# Patient Record
Sex: MALE | Race: WHITE | HISPANIC OR LATINO | Employment: FULL TIME | URBAN - METROPOLITAN AREA
[De-identification: names, ages, dates, MRNs, and addresses within clinical notes are randomized per-mention and may not be internally consistent; named-entity substitution may affect disease eponyms.]

---

## 2019-06-30 ENCOUNTER — HOSPITAL ENCOUNTER (EMERGENCY)
Facility: MEDICAL CENTER | Age: 19
End: 2019-06-30
Attending: EMERGENCY MEDICINE | Admitting: EMERGENCY MEDICINE
Payer: MEDICAID

## 2019-06-30 VITALS
RESPIRATION RATE: 18 BRPM | OXYGEN SATURATION: 96 % | WEIGHT: 179.23 LBS | BODY MASS INDEX: 28.13 KG/M2 | DIASTOLIC BLOOD PRESSURE: 78 MMHG | HEART RATE: 75 BPM | HEIGHT: 67 IN | SYSTOLIC BLOOD PRESSURE: 127 MMHG | TEMPERATURE: 98.4 F

## 2019-06-30 DIAGNOSIS — R04.0 EPISTAXIS: ICD-10-CM

## 2019-06-30 PROCEDURE — 700102 HCHG RX REV CODE 250 W/ 637 OVERRIDE(OP): Performed by: EMERGENCY MEDICINE

## 2019-06-30 PROCEDURE — A9270 NON-COVERED ITEM OR SERVICE: HCPCS | Performed by: EMERGENCY MEDICINE

## 2019-06-30 PROCEDURE — 99283 EMERGENCY DEPT VISIT LOW MDM: CPT

## 2019-06-30 RX ORDER — OXYMETAZOLINE HYDROCHLORIDE 0.05 G/100ML
2 SPRAY NASAL ONCE
Status: COMPLETED | OUTPATIENT
Start: 2019-06-30 | End: 2019-06-30

## 2019-06-30 RX ADMIN — OXYMETAZOLINE HYDROCHLORIDE 2 SPRAY: 5 SPRAY NASAL at 05:30

## 2019-06-30 NOTE — ED PROVIDER NOTES
"ED Provider Note    CHIEF COMPLAINT  Chief Complaint   Patient presents with   • Epistaxis       HPI  Damian Brandt is a 19 y.o. male who is otherwise healthy, has had recent upper respiratory tract infection, who presents with intermittent epistaxis for the past 2 days.  Initially would last only about 20 minutes, however he woke up this morning around midnight with bleeding from the left nostril, this then progressed to bleeding to the right nostril, and he has had difficulty with controlling this which is why he presents for evaluation.  No known easy bruisability, no noted bleeding from gums, no other complaints.  Not on any blood thinners or antiplatelet agents.    REVIEW OF SYSTEMS  Pertinent positives: Upper respiratory tract infection, epistaxis  Pertinent negatives: No bruising, no weakness, no dizziness, no bleeding from the gums  10 + systems reviewed and otherwise negative    PAST MEDICAL HISTORY       SOCIAL HISTORY  Social History     Social History Main Topics   • Smoking status: Not on file   • Smokeless tobacco: Not on file   • Alcohol use Not on file   • Drug use: Unknown   • Sexual activity: Not on file       SURGICAL HISTORY  patient denies any surgical history    CURRENT MEDICATIONS  Home Medications    **Home medications have not yet been reviewed for this encounter**         ALLERGIES  No Known Allergies    PHYSICAL EXAM  VITAL SIGNS: /92   Pulse (!) 104   Temp 36.9 °C (98.4 °F) (Temporal)   Resp 18   Ht 1.702 m (5' 7\")   Wt 81.3 kg (179 lb 3.7 oz)   SpO2 96%   BMI 28.07 kg/m²   Pulse ox interpretation: I interpret this pulse ox as normal  Constitutional: Alert, in no apparent distress   HENT: Normocephalic, Atraumatic, Bilateral external ears normal.  Dark blood seeping from right nostril.  Clot occluding visualization of left turbinates and nasal passage.  Eyes: Pupils are equal and reactive. Conjunctiva normal, non-icteric.   Heart: Regular rate and rythm, no " murmurs. Radial pulses 2+  Lungs:  Clear to auscultation bilaterally.   Skin: Warm, Dry, No erythema, No rash.   Musculoskeletal: No obvious deformities  Neurologic: Alert, Fluent speech. No facial droop. Grossly non-focal.   Psychiatric: Affect normal, Judgment normal, Mood normal, Appears appropriate and not intoxicated.     DIAGNOSTIC STUDIES / PROCEDURES        COURSE & MEDICAL DECISION MAKING  Nursing notes and vital signs reviewed. Pertinent Labs & Imaging studies reviewed. (See chart for details)    19-year-old male with upper respiratory tract infection and bleeding from both nostrils.  Physical exam here is notable for blood clot in the left nostril, slow seepage of blood from right nostril.  Given patient has no history of easy bruisability, bleeding from the gums, or other associated symptoms, this is most likely secondary to patient's upper respiratory tract infection.  Clamp was placed of the nose for 15 minutes.  Nose was reexamined and there was no further active bleeding.  Patient was encouraged to gently blow nose, large amount of clot was produced from both nostrils.  Nose was examined, no obvious bleeding site identified.  Afrin, 2 sprays, were used in both nostrils.  Patient was observed for period of 30 minutes following above with no further bleeding.  Discharge to home with strict return precautions and improved condition.      DISPOSITION:  Patient will be discharged home in stable condition.    FOLLOW UP:  [facility], Emergency Dept  [address]  [place] [address]  [phone]    As needed      OUTPATIENT MEDICATIONS:  New Prescriptions    No medications on file       FINAL IMPRESSION  (R04.0) Epistaxis      Electronically signed by: Malathi Martinez, 6/30/2019 3:51 AM    This dictation was created using voice recognition software. The accuracy of the dictation is limited to the abilities of the software. I expect there may be some errors of grammar  and possibly content. The nursing notes were reviewed and certain aspects of this information were incorporated into this note.

## 2019-06-30 NOTE — ED NOTES
Patient discharged home. No acute distress at this time. VS stable. No bleeding noted at this time.

## 2019-06-30 NOTE — DISCHARGE INSTRUCTIONS
You are being discharged to home with Afrin.  This is a medication that helps constrict the blood vessels.  If you notice that your nose is starting to bleed then please use the Afrin.  Otherwise use the Afrin 2 sprays in each nostril twice daily for the next 2 days.  Try and keep your nose moist with a saline spray, these can be purchased at any pharmacy.  Return for persistent bleeding, dizziness, lightheadedness, or any other concerns.

## 2021-06-04 ENCOUNTER — IMMUNIZATION (OUTPATIENT)
Dept: FAMILY PLANNING/WOMEN'S HEALTH CLINIC | Facility: IMMUNIZATION CENTER | Age: 21
End: 2021-06-04
Payer: MEDICAID

## 2021-06-04 DIAGNOSIS — Z23 ENCOUNTER FOR VACCINATION: Primary | ICD-10-CM

## 2021-06-04 PROCEDURE — 0002A PFIZER SARS-COV-2 VACCINE: CPT

## 2021-06-04 PROCEDURE — 91300 PFIZER SARS-COV-2 VACCINE: CPT

## 2024-10-30 ENCOUNTER — HOSPITAL ENCOUNTER (EMERGENCY)
Facility: MEDICAL CENTER | Age: 24
End: 2024-10-31
Attending: EMERGENCY MEDICINE

## 2024-10-30 ENCOUNTER — HOSPITAL ENCOUNTER (EMERGENCY)
Facility: MEDICAL CENTER | Age: 24
End: 2024-10-30
Attending: STUDENT IN AN ORGANIZED HEALTH CARE EDUCATION/TRAINING PROGRAM

## 2024-10-30 VITALS
RESPIRATION RATE: 18 BRPM | DIASTOLIC BLOOD PRESSURE: 99 MMHG | HEART RATE: 102 BPM | BODY MASS INDEX: 37.11 KG/M2 | WEIGHT: 280 LBS | OXYGEN SATURATION: 97 % | SYSTOLIC BLOOD PRESSURE: 170 MMHG | TEMPERATURE: 96.6 F | HEIGHT: 73 IN

## 2024-10-30 DIAGNOSIS — N50.811 TESTICULAR PAIN, RIGHT: ICD-10-CM

## 2024-10-30 PROCEDURE — 99283 EMERGENCY DEPT VISIT LOW MDM: CPT

## 2024-10-30 PROCEDURE — 96372 THER/PROPH/DIAG INJ SC/IM: CPT

## 2024-10-30 PROCEDURE — 36415 COLL VENOUS BLD VENIPUNCTURE: CPT

## 2024-10-31 ENCOUNTER — APPOINTMENT (OUTPATIENT)
Dept: RADIOLOGY | Facility: MEDICAL CENTER | Age: 24
End: 2024-10-31
Attending: EMERGENCY MEDICINE

## 2024-10-31 VITALS
RESPIRATION RATE: 17 BRPM | DIASTOLIC BLOOD PRESSURE: 99 MMHG | TEMPERATURE: 97.9 F | HEART RATE: 104 BPM | WEIGHT: 279.98 LBS | HEIGHT: 73 IN | OXYGEN SATURATION: 93 % | BODY MASS INDEX: 37.11 KG/M2 | SYSTOLIC BLOOD PRESSURE: 181 MMHG

## 2024-10-31 LAB
AMORPHOUS CRYSTALS 1764: PRESENT /HPF
APPEARANCE UR: ABNORMAL
BACTERIA #/AREA URNS HPF: ABNORMAL /HPF
BILIRUB UR QL STRIP.AUTO: NEGATIVE
CASTS URNS QL MICRO: ABNORMAL /LPF (ref 0–2)
COLOR UR: YELLOW
EPITHELIAL CELLS 1715: ABNORMAL /HPF (ref 0–5)
GLUCOSE UR STRIP.AUTO-MCNC: NEGATIVE MG/DL
KETONES UR STRIP.AUTO-MCNC: NEGATIVE MG/DL
LEUKOCYTE ESTERASE UR QL STRIP.AUTO: NEGATIVE
MICRO URNS: ABNORMAL
NITRITE UR QL STRIP.AUTO: NEGATIVE
PH UR STRIP.AUTO: 7.5 [PH] (ref 5–8)
PROT UR QL STRIP: NEGATIVE MG/DL
RBC # URNS HPF: ABNORMAL /HPF (ref 0–2)
RBC UR QL AUTO: NEGATIVE
SP GR UR STRIP.AUTO: 1.02
UROBILINOGEN UR STRIP.AUTO-MCNC: 1 EU/DL
WBC #/AREA URNS HPF: ABNORMAL /HPF

## 2024-10-31 PROCEDURE — 700102 HCHG RX REV CODE 250 W/ 637 OVERRIDE(OP): Performed by: EMERGENCY MEDICINE

## 2024-10-31 PROCEDURE — 76870 US EXAM SCROTUM: CPT

## 2024-10-31 PROCEDURE — A9270 NON-COVERED ITEM OR SERVICE: HCPCS | Performed by: EMERGENCY MEDICINE

## 2024-10-31 PROCEDURE — 81015 MICROSCOPIC EXAM OF URINE: CPT

## 2024-10-31 PROCEDURE — 81003 URINALYSIS AUTO W/O SCOPE: CPT

## 2024-10-31 RX ORDER — ACETAMINOPHEN 325 MG/1
650 TABLET ORAL ONCE
Status: COMPLETED | OUTPATIENT
Start: 2024-10-31 | End: 2024-10-31

## 2024-10-31 RX ADMIN — ACETAMINOPHEN 650 MG: 325 TABLET ORAL at 02:19

## 2024-10-31 NOTE — ED TRIAGE NOTES
Chief Complaint   Patient presents with    Testicle Pain     Pt reports pain and swelling to right testicle x2 days    Leg Pain     Pt reports tenderness and tingling to his right thigh.     Ambulatory  to triage for above complaint with family. Pt reports having intercourse 3 days prior and trying a new ring toy that goes around his testicles and a thc gummy. Pt states that he developed right testicular pain and swelling afterwards. Pt states he has also had pain and tingling to his RLE around his thigh. Pt denies hematuria but reports urinating more frequently and some discomfort when having a BM. Denies any open wound, discharge, or bleeding at the site.     Pt placed in lobby and educated on triage process. Pt encouraged to alert staff for any changes.    Patient and staff wearing appropriate PPE.

## 2024-10-31 NOTE — ED NOTES
This version of the note has been redacted during the course of a chart correction case. If you need access to the original text of this version of the note, please contact the Health Information Management department at [phone].

## 2024-10-31 NOTE — ED TRIAGE NOTES
"Chief Complaint   Patient presents with    Testicle Pain     Chief Complaint  Patient presents with  · Testicle Pain      Pt reports pain and swelling to right testicle x2 days  · Leg Pain      Pt reports tenderness and tingling to his right thigh.    Ambulatory  to triage for above complaint with family. Pt reports having intercourse 3 days prior and trying a new ring toy that goes around his testicles and a thc gummy. Pt states that he developed right testicular pain and swelling afterwards. Pt states he has also had pain and tingling to his RLE around his thig     BP (!) 192/113   Pulse (!) 101   Temp 36.5 °C (97.7 °F) (Temporal)   Resp 20   Ht 1.854 m (6' 1\")   Wt (!) 127 kg (279 lb 15.8 oz)   SpO2 94%   BMI 36.94 kg/m²     Pt here for above cc  Testicle pain and swelling to R side for x2 days  VSS, A&O x4, ambulatory   Denies any PMH or meds   "

## 2024-10-31 NOTE — ED PROVIDER NOTES
ED Provider Note    CHIEF COMPLAINT  Chief Complaint   Patient presents with    Testicle Pain     Chief Complaint  Patient presents with  · Testicle Pain      Pt reports pain and swelling to right testicle x2 days  · Leg Pain      Pt reports tenderness and tingling to his right thigh.    Ambulatory  to triage for above complaint with family. Pt reports having intercourse 3 days prior and trying a new ring toy that goes around his testicles and a thc gummy. Pt states that he developed right testicular pain and swelling afterwards. Pt states he has also had pain and tingling to his RLE around his thig       EXTERNAL RECORDS REVIEWED  Other noncontributory    HPI/ROS  LIMITATION TO HISTORY   Select: : None  OUTSIDE HISTORIAN(S):  Significant other      Dc Timmons is a 24 y.o. male who presents to the emergency department through triage with significant other for right testicular pain.  Patient describes 2 days of persistent right testicular pain.  States right testicle sits higher than the left now.  No swelling or discoloration.  Urinary frequency without discomfort, hematuria.  No abdominal pain or flank pain.  Patient states pain began after using a penile or testicular ring during intercourse almost 24 hours prior to symptom onset.  Denies penile drainage or concern for STD.    Patient describes some chronic low back pain, intermittent paresthesias to the right posterior thigh.  He states this is due to work.    PAST MEDICAL HISTORY   Denies    SURGICAL HISTORY  patient denies any surgical history    FAMILY HISTORY  History reviewed. No pertinent family history.    SOCIAL HISTORY  Social History     Tobacco Use    Smoking status: Never    Smokeless tobacco: Never   Substance and Sexual Activity    Alcohol use: Not Currently    Drug use: Never    Sexual activity: Not on file       CURRENT MEDICATIONS  Home Medications       Reviewed by Bettie Fernandes R.N. (Registered Nurse) on 10/30/24 at  "2349  Med List Status: Partial     Medication Last Dose Status        Patient Darwin Taking any Medications                           ALLERGIES  Allergies   Allergen Reactions    Shellfish-Derived Products Anaphylaxis       PHYSICAL EXAM  VITAL SIGNS: BP (!) 181/99   Pulse (!) 104   Temp 36.6 °C (97.9 °F) (Temporal)   Resp 17   Ht 1.854 m (6' 1\")   Wt (!) 127 kg (279 lb 15.8 oz)   SpO2 93%   BMI 36.94 kg/m²    Pulse ox interpretation: I interpret this pulse ox as normal.  Constitutional: Alert in no apparent distress.  HENT: Normocephalic, atraumatic. Bilateral external ears normal, Nose normal.   Eyes: Pupils are equal and reactive, Conjunctiva normal.   Neck: Normal range of motion, Supple  Lymphatic: No lymphadenopathy noted.   Cardiovascular: Regular rate and rhythm, no murmurs. Distal pulses intact.  No peripheral edema.  Thorax & Lungs: Normal breath sounds.  No wheezing/rales/ronchi. No increased work of breathing, clipped speech or retractions.   Abdomen: Soft, non-distended, non-tender to palpation. No palpable or pulsatile masses. No peritoneal signs. No CVA tenderness.  : Uncircumcised.  2 descended testicles.  Right testicle is mildly tender to palpation.  Right testicle does have higher lie compared to left without swelling, discoloration  Skin: Warm, Dry, No erythema, No rash.   Musculoskeletal: Good range of motion in all major joints.   Neurologic: Alert and orient x 4.  Psychiatric: Affect normal, Judgment normal, Mood normal.       EKG/LABS  Results for orders placed or performed during the hospital encounter of 10/30/24   URINALYSIS    Collection Time: 10/31/24 12:26 AM    Specimen: Urine   Result Value Ref Range    Color Yellow     Character Turbid (A)     Specific Gravity 1.017 <1.035    Ph 7.5 5.0 - 8.0    Glucose Negative Negative mg/dL    Ketones Negative Negative mg/dL    Protein Negative Negative mg/dL    Bilirubin Negative Negative    Urobilinogen, Urine 1.0 <=1.0 EU/dL    Nitrite " Negative Negative    Leukocyte Esterase Negative Negative    Occult Blood Negative Negative    Micro Urine Req Microscopic    URINE MICROSCOPIC (W/UA)    Collection Time: 10/31/24 12:26 AM   Result Value Ref Range    WBC 0-2 /hpf    RBC 0-2 0 - 2 /hpf    Bacteria None Seen None /hpf    Epithelial Cells 0-2 0 - 5 /hpf    Amorphous Crystal Present (A) Absent /hpf    Urine Casts 0-2 0 - 2 /lpf       RADIOLOGY/PROCEDURES     Radiologist interpretation:  [id number]   Final Result      1.  Normal scrotum ultrasound.          COURSE & MEDICAL DECISION MAKING    ASSESSMENT, COURSE AND PLAN  Care Narrative:   Seen evaluated bedside.  Complaining of right scrotal, testicular pain for 2 days after using a new toy/penile/scrotal ring during intercourse the day prior.  Urinary frequency without other symptoms.  No abdominal pain, flank pain.  No fever or chills.  Denies STD.  Add urinalysis and ultrasound.    Urinalysis is within normal limits.  Ultrasound negative for torsion, epididymitis or other abnormality.      ADDITIONAL PROBLEMS MANAGED  Hypertension -denies known history of the same.  Describes increased stress at work lately.  Occasional headache but none at this time.  Will follow-up with primary care for monitoring, med management    DISPOSITION AND DISCUSSIONS  ED evaluation for right scrotal/testicular pain is unrevealing.  May be due to trauma, strain after intercourse or new activities a few days ago.  Cannot exclude radicular pain although he is neurologically intact and nonfocal.  Describes chronic low back pain without history of clinical evidence to suggest cauda equina, epidural abscess or spinal cord compression syndrome.  Full control of his bladder.  Urinalysis is within normal limits, ultrasound normal as well.  Stable for discharge home, Motrin for discomfort.    Discussion of management with other [facility] or appropriate source(s): None     Escalation of care considered, and ultimately not  performed:Laboratory analysis    Decision tools and prescription drugs considered including, but not limited to: Pain Medications Tylenol or ibuprofen more appropriate in the setting .    The patient is stable for discharge home, anticipatory guidance provided, close follow-up is encouraged and strict return instructions have been discussed. Patient is agreeable to the disposition and plan.      FINAL DIAGNOSIS  1. Testicular pain, right         Electronically signed by: Josette Engle D.O., 10/31/2024 12:37 AM

## 2024-10-31 NOTE — ED NOTES
Break RN:  Pt re-triaged. Pt has twin w/ same exact name but different middle initial and had to adjust charts

## 2024-10-31 NOTE — DISCHARGE INSTRUCTIONS
Follow-up with primary care as soon as possible for reevaluation, to establish care, for medication management and close blood pressure monitoring.    Tylenol or ibuprofen as needed for discomfort.    Return to emergency department for persistent worsening testicular pain, abdominal pain, flank pain, hematuria, fever, vomiting, lower extremity weakness or paresthesias or other new concerns.

## 2024-12-29 ENCOUNTER — HOSPITAL ENCOUNTER (EMERGENCY)
Facility: MEDICAL CENTER | Age: 24
End: 2024-12-30
Attending: EMERGENCY MEDICINE
Payer: COMMERCIAL

## 2024-12-29 ENCOUNTER — APPOINTMENT (OUTPATIENT)
Dept: RADIOLOGY | Facility: MEDICAL CENTER | Age: 24
End: 2024-12-29
Attending: EMERGENCY MEDICINE
Payer: COMMERCIAL

## 2024-12-29 DIAGNOSIS — S00.33XA CONTUSION OF NOSE, INITIAL ENCOUNTER: ICD-10-CM

## 2024-12-29 PROCEDURE — 70160 X-RAY EXAM OF NASAL BONES: CPT

## 2024-12-29 PROCEDURE — 99283 EMERGENCY DEPT VISIT LOW MDM: CPT

## 2024-12-29 ASSESSMENT — PAIN DESCRIPTION - PAIN TYPE: TYPE: ACUTE PAIN

## 2024-12-30 VITALS
HEART RATE: 89 BPM | DIASTOLIC BLOOD PRESSURE: 90 MMHG | BODY MASS INDEX: 38.45 KG/M2 | OXYGEN SATURATION: 98 % | TEMPERATURE: 97.1 F | SYSTOLIC BLOOD PRESSURE: 148 MMHG | HEIGHT: 73 IN | WEIGHT: 290.13 LBS | RESPIRATION RATE: 16 BRPM

## 2024-12-30 NOTE — ED NOTES
Discharge education provided by ERP. Discharge paperwork signed by pt. All questions answered. All belongings with pt. Pt ambulated to lobby unassisted with steady gait.
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
## 2025-06-05 ENCOUNTER — TELEPHONE (OUTPATIENT)
Dept: HEALTH INFORMATION MANAGEMENT | Facility: OTHER | Age: 25
End: 2025-06-05

## 2025-08-14 DIAGNOSIS — Z31.440 ENCOUNTER OF MALE FOR TESTING FOR GENETIC DISEASE CARRIER STATUS FOR PROCREATIVE MANAGEMENT: Primary | ICD-10-CM
